# Patient Record
Sex: MALE | Race: WHITE | Employment: OTHER | URBAN - METROPOLITAN AREA
[De-identification: names, ages, dates, MRNs, and addresses within clinical notes are randomized per-mention and may not be internally consistent; named-entity substitution may affect disease eponyms.]

---

## 2017-10-04 NOTE — PATIENT DISCUSSION
Pt educated on Basic + OU: sarah. Pt educated that they will need glasses for all focal points after surgery.

## 2018-05-31 NOTE — PATIENT DISCUSSION
Recommended artificial tears to use: 1 drop 4x a day in both eyes Refresh Optive Advanced sampled, also recommended trying a gel formula.

## 2021-06-08 NOTE — PATIENT DISCUSSION
pt reassured, no signs seen, use ATs. Star Wedge Flap Text: The defect edges were debeveled with a #15 scalpel blade.  Given the location of the defect, shape of the defect and the proximity to free margins a star wedge flap was deemed most appropriate.  Using a sterile surgical marker, an appropriate rotation flap was drawn incorporating the defect and placing the expected incisions within the relaxed skin tension lines where possible. The area thus outlined was incised deep to adipose tissue with a #15 scalpel blade.  The skin margins were undermined to an appropriate distance in all directions utilizing iris scissors.

## 2021-06-08 NOTE — PATIENT DISCUSSION
Recommended warm compresses with microwavable eye mask for 10 minutes and take 3,000 mg flaxseed oil by mouth daily.

## 2021-07-23 ENCOUNTER — NEW PATIENT COMPREHENSIVE (OUTPATIENT)
Dept: URBAN - METROPOLITAN AREA CLINIC 25 | Facility: CLINIC | Age: 81
End: 2021-07-23

## 2021-07-23 DIAGNOSIS — H52.4: ICD-10-CM

## 2021-07-23 PROCEDURE — 92015 DETERMINE REFRACTIVE STATE: CPT

## 2021-07-23 PROCEDURE — 92004 COMPRE OPH EXAM NEW PT 1/>: CPT

## 2021-07-23 ASSESSMENT — KERATOMETRY
OD_K1POWER_DIOPTERS: 34.25
OS_AXISANGLE_DEGREES: 118
OS_K2POWER_DIOPTERS: 41.50
OD_K2POWER_DIOPTERS: 41.25
OD_AXISANGLE2_DEGREES: 130
OS_AXISANGLE2_DEGREES: 28
OS_K1POWER_DIOPTERS: 33.25
OD_AXISANGLE_DEGREES: 40

## 2021-07-23 ASSESSMENT — VISUAL ACUITY
OS_CC: 20/20-2
OD_CC: 20/20-1

## 2021-07-23 ASSESSMENT — TONOMETRY
OS_IOP_MMHG: 9
OD_IOP_MMHG: 10

## 2022-07-25 ENCOUNTER — ESTABLISHED PATIENT (OUTPATIENT)
Dept: URBAN - METROPOLITAN AREA CLINIC 25 | Facility: CLINIC | Age: 82
End: 2022-07-25

## 2022-07-25 DIAGNOSIS — H52.4: ICD-10-CM

## 2022-07-25 DIAGNOSIS — H25.13: ICD-10-CM

## 2022-07-25 PROCEDURE — 92014 COMPRE OPH EXAM EST PT 1/>: CPT

## 2022-07-25 PROCEDURE — 92015 DETERMINE REFRACTIVE STATE: CPT

## 2022-07-25 ASSESSMENT — KERATOMETRY
OD_K2POWER_DIOPTERS: 41.75
OD_AXISANGLE2_DEGREES: 130
OS_AXISANGLE2_DEGREES: 28
OS_K1POWER_DIOPTERS: 41.25
OD_K1POWER_DIOPTERS: 34.25
OS_K1POWER_DIOPTERS: 33.25
OD_AXISANGLE_DEGREES: 91
OS_K2POWER_DIOPTERS: 42.25
OS_AXISANGLE_DEGREES: 118
OS_AXISANGLE_DEGREES: 99
OD_K2POWER_DIOPTERS: 41.25
OS_K2POWER_DIOPTERS: 41.50
OD_K1POWER_DIOPTERS: 41.25
OD_AXISANGLE2_DEGREES: 1
OS_AXISANGLE2_DEGREES: 9
OD_AXISANGLE_DEGREES: 40

## 2022-07-25 ASSESSMENT — TONOMETRY
OS_IOP_MMHG: 14
OD_IOP_MMHG: 14

## 2022-07-25 ASSESSMENT — VISUAL ACUITY
OS_CC: 20/25
OD_CC: 20/30-2

## 2022-12-16 ENCOUNTER — P2P PATIENT RECORD (OUTPATIENT)
Age: 82
End: 2022-12-16

## 2022-12-20 ENCOUNTER — TELEPHONE ENCOUNTER (OUTPATIENT)
Dept: URBAN - METROPOLITAN AREA CLINIC 63 | Facility: CLINIC | Age: 82
End: 2022-12-20

## 2023-01-19 ENCOUNTER — DASHBOARD ENCOUNTERS (OUTPATIENT)
Age: 83
End: 2023-01-19

## 2023-01-19 ENCOUNTER — OFFICE VISIT (OUTPATIENT)
Dept: URBAN - METROPOLITAN AREA CLINIC 63 | Facility: CLINIC | Age: 83
End: 2023-01-19
Payer: MEDICARE

## 2023-01-19 ENCOUNTER — WEB ENCOUNTER (OUTPATIENT)
Dept: URBAN - METROPOLITAN AREA CLINIC 63 | Facility: CLINIC | Age: 83
End: 2023-01-19

## 2023-01-19 VITALS
HEIGHT: 69 IN | TEMPERATURE: 97.8 F | HEART RATE: 69 BPM | SYSTOLIC BLOOD PRESSURE: 122 MMHG | DIASTOLIC BLOOD PRESSURE: 80 MMHG | BODY MASS INDEX: 29.62 KG/M2 | OXYGEN SATURATION: 99 % | WEIGHT: 200 LBS

## 2023-01-19 DIAGNOSIS — R93.89 ABNORMAL FINDING ON IMAGING: ICD-10-CM

## 2023-01-19 PROBLEM — 408574004: Status: ACTIVE | Noted: 2023-01-19

## 2023-01-19 PROCEDURE — 99203 OFFICE O/P NEW LOW 30 MIN: CPT | Performed by: INTERNAL MEDICINE

## 2023-01-19 RX ORDER — GABAPENTIN 600 MG/1
TABLET ORAL
Qty: 45 TABLET | Status: ACTIVE | COMMUNITY

## 2023-01-19 RX ORDER — KETOCONAZOLE CREAM, 2% 20 MG/G
CREAM TOPICAL
Qty: 15 GRAM | Status: ACTIVE | COMMUNITY

## 2023-01-19 NOTE — HPI-TODAY'S VISIT:
Jesus is a 83-year-old male presents today as a new patient.  Referred to our office due to abnormal CT scan.  He had recent CT of the abdomen and pelvis with oral contrast but no IV contrast 12/13/2022.  There was an abnormal fat density focus in the duodenum.  Full CT scan report is summarized below.  Due to these abnormal findings he was referred here for further evaluation.  He is currently asymptomatic.  He has no GI complaints at this time.  Denies abdominal pain.  Denies nausea or vomiting.  Denies hematemesis or melena.  Denies unintentional weight loss.  We discussed further work-up including repeat CT scan with contrast as well as endoscopic evaluation with EGD/enteroscopy and possibly EUS.  He does not want to pursue either of these at this time. Labs 11/2022:WBC 13.5, hemoglobin 16.4, platelet 255, sodium 143, potassium 3.9, creatinine 0.92, albumin 4.6, alkaline phosphatase 68, AST 32, ALT 34, bilirubin 0.6. CT abdomen and pelvis with oral contrast but without contrast 12/13/2022:Evaluation of soft tissues is limited without IV contrast.  Gallbladder unremarkable.  Fatty and atrophic appearing pancreas.  Cystic changes in the left kidney.  Nonobstructing 3.6 mm calculus in the right kidney.  No lymphadenopathy.  Stomach appears unremarkable.  Fat density focus in the distal duodenum measuring 14 mm which may reflect a lipoma. CT abdomen and pelvis with contrast 05/20/2021:Mild diffuse fatty infiltration of the liver.  No focal liver lesion.  No significant abnormality in the gallbladder or pancreas.  No significant abnormality in the retroperitoneum.  There is a cyst in the left kidney but no enhancing lesions or obstructive uropathy.  Diverticulosis without diverticulitis.

## 2023-07-26 ENCOUNTER — ESTABLISHED PATIENT (OUTPATIENT)
Dept: URBAN - METROPOLITAN AREA CLINIC 25 | Facility: CLINIC | Age: 83
End: 2023-07-26

## 2023-07-26 DIAGNOSIS — H52.4: ICD-10-CM

## 2023-07-26 PROCEDURE — 92014 COMPRE OPH EXAM EST PT 1/>: CPT

## 2023-07-26 PROCEDURE — 92015 DETERMINE REFRACTIVE STATE: CPT

## 2023-07-26 ASSESSMENT — KERATOMETRY
OD_K1POWER_DIOPTERS: 41.25
OS_K2POWER_DIOPTERS: 42.25
OS_AXISANGLE_DEGREES: 118
OD_AXISANGLE_DEGREES: 91
OD_K2POWER_DIOPTERS: 41.75
OS_K1POWER_DIOPTERS: 41.25
OS_AXISANGLE2_DEGREES: 10
OS_AXISANGLE2_DEGREES: 9
OD_K2POWER_DIOPTERS: 42.50
OD_AXISANGLE2_DEGREES: 1
OS_AXISANGLE_DEGREES: 100
OS_AXISANGLE_DEGREES: 99
OD_AXISANGLE2_DEGREES: 5
OD_AXISANGLE_DEGREES: 95
OD_K1POWER_DIOPTERS: 41.50
OD_K1POWER_DIOPTERS: 34.25
OS_K2POWER_DIOPTERS: 41.50
OD_K2POWER_DIOPTERS: 41.25
OD_AXISANGLE_DEGREES: 40
OS_K1POWER_DIOPTERS: 33.25
OS_AXISANGLE2_DEGREES: 28
OD_AXISANGLE2_DEGREES: 130

## 2023-07-26 ASSESSMENT — VISUAL ACUITY
OD_CC: 20/25
OS_CC: 20/20

## 2023-07-26 ASSESSMENT — TONOMETRY
OS_IOP_MMHG: 14
OD_IOP_MMHG: 13

## 2024-07-30 ENCOUNTER — COMPREHENSIVE EXAM (OUTPATIENT)
Dept: URBAN - METROPOLITAN AREA CLINIC 25 | Facility: CLINIC | Age: 84
End: 2024-07-30

## 2024-07-30 DIAGNOSIS — H52.4: ICD-10-CM

## 2024-07-30 PROCEDURE — 92015 DETERMINE REFRACTIVE STATE: CPT

## 2024-07-30 PROCEDURE — 92014 COMPRE OPH EXAM EST PT 1/>: CPT

## 2024-07-30 ASSESSMENT — KERATOMETRY
OS_AXISANGLE_DEGREES: 100
OS_AXISANGLE_DEGREES: 98
OS_AXISANGLE_DEGREES: 118
OS_AXISANGLE2_DEGREES: 8
OS_K1POWER_DIOPTERS: 33.25
OD_K1POWER_DIOPTERS: 41.25
OD_K2POWER_DIOPTERS: 42.50
OD_K1POWER_DIOPTERS: 41.50
OS_K1POWER_DIOPTERS: 41.25
OS_K1POWER_DIOPTERS: 41.50
OD_AXISANGLE_DEGREES: 91
OD_AXISANGLE_DEGREES: 90
OD_AXISANGLE2_DEGREES: 180
OD_AXISANGLE2_DEGREES: 5
OD_K1POWER_DIOPTERS: 34.25
OD_K2POWER_DIOPTERS: 41.25
OD_AXISANGLE_DEGREES: 40
OS_K2POWER_DIOPTERS: 41.50
OD_K2POWER_DIOPTERS: 42.00
OD_AXISANGLE2_DEGREES: 130
OS_AXISANGLE2_DEGREES: 10
OD_K2POWER_DIOPTERS: 41.75
OD_AXISANGLE2_DEGREES: 1
OS_AXISANGLE_DEGREES: 99
OD_AXISANGLE_DEGREES: 95
OS_K2POWER_DIOPTERS: 42.25
OS_AXISANGLE2_DEGREES: 28
OS_AXISANGLE2_DEGREES: 9
OD_K1POWER_DIOPTERS: 41.00

## 2024-07-30 ASSESSMENT — VISUAL ACUITY
OS_CC: 20/25
OD_CC: 20/30

## 2024-07-30 ASSESSMENT — TONOMETRY
OS_IOP_MMHG: 13
OD_IOP_MMHG: 13